# Patient Record
Sex: MALE | Race: WHITE | Employment: OTHER | URBAN - METROPOLITAN AREA
[De-identification: names, ages, dates, MRNs, and addresses within clinical notes are randomized per-mention and may not be internally consistent; named-entity substitution may affect disease eponyms.]

---

## 2021-08-15 ENCOUNTER — HOSPITAL ENCOUNTER (EMERGENCY)
Age: 76
Discharge: HOME | End: 2021-08-15
Payer: MEDICARE

## 2021-08-15 VITALS
DIASTOLIC BLOOD PRESSURE: 79 MMHG | RESPIRATION RATE: 16 BRPM | BODY MASS INDEX: 26.45 KG/M2 | SYSTOLIC BLOOD PRESSURE: 150 MMHG | OXYGEN SATURATION: 95 % | TEMPERATURE: 98.4 F | HEART RATE: 75 BPM | WEIGHT: 206 LBS

## 2021-08-15 DIAGNOSIS — L23.7 POISON IVY DERMATITIS: Primary | ICD-10-CM

## 2021-08-15 PROCEDURE — 99211 OFF/OP EST MAY X REQ PHY/QHP: CPT

## 2021-08-15 RX ORDER — PREDNISONE 20 MG/1
TABLET ORAL
Qty: 18 TABLET | Refills: 0 | Status: SHIPPED | OUTPATIENT
Start: 2021-08-15 | End: 2021-08-25

## 2021-08-15 NOTE — ED PROVIDER NOTES
adventitious breath sounds. Abdomen: Soft, nonsurgical. Nontender. No peritoneal signs. Normoactive bowel sounds. Extremities: No peripheral edema. Negative Homans bilaterally, no cords. Neurovascularly intact throughout. Skin: There is a very mild scattered rash bilaterally on the forehead as well as the right neck. No evidence of secondary infection. Negative Nikolsky sign. No mucous membrane lesions. Neuro: No gross neurologic deficits. Lab / Imaging Results   (All laboratory and radiology results have been personally reviewed by myself)  Labs:  No results found for this visit on 08/15/21. Imaging: All Radiology results interpreted by Radiologist unless otherwise noted. No orders to display       ED Course / Medical Decision Making   Medications - No data to display       Consult(s):   None    Procedure(s):   None    Differential Diagnosis: Is extensive but includes poison ivy dermatitis, dermatitis, tinea, cellulitis, lymphangitis, cutaneous abscess, urticaria, etc.    MDM:   This is a 70 y.o. male who presents with the above history and exam findings. He was concerned for possible zoster however we discussed this would be bilateral cranial nerves as well as the spinal nerve which would be incredibly uncommon with zoster. May represent a very early poison ivy rash. With home-going with a tapering dose of prednisone. No evidence of secondary infection. Counseling: I discussed the differential, results and discharge plan with the patient and/or family/friend/caregiver if present. I emphasized the importance of follow-up with the physician I referred them to in the timeframe recommended. I explained reasons for the patient to return to the Emergency Department. Additional verbal discharge instructions were also given and discussed with the patient to supplement those generated by the EMR.  We also discussed medications that were prescribed (if any) including common side effects and interactions. The patient was advised to abstain from driving, operating heavy machinery or making significant decisions while taking medications such as opiates and muscle relaxers that may impair this. All questions were addressed. They understand return precautions and discharge instructions. The patient and/or family/friend/caregiver expressed understanding. Assessment      1. Poison ivy dermatitis      Plan   Discharge to home and advised to contact Chiqui Romero, 2021 Kaley Rabago [phone]      As needed   Patient condition is good    New Medications     New Prescriptions    PREDNISONE (DELTASONE) 20 MG TABLET    Sig.: Take 60mg  Po qd x 3 days, then 40mg po qd x3 days, then 20mg po qd x 3 days. QS x 9 days     Electronically signed by EFRAÍN Solano   DD: 8/15/21  **This report was transcribed using voice recognition software. Every effort was made to ensure accuracy; however, inadvertent computerized transcription errors may be present.   END OF ED PROVIDER NOTE              Indigo Corea [address] [place], [place]  08/15/21 3314

## 2022-08-21 ENCOUNTER — HOSPITAL ENCOUNTER (EMERGENCY)
Age: 77
Discharge: HOME | End: 2022-08-21
Payer: MEDICARE

## 2022-08-21 VITALS
RESPIRATION RATE: 14 BRPM | OXYGEN SATURATION: 98 % | WEIGHT: 205 LBS | BODY MASS INDEX: 26.32 KG/M2 | SYSTOLIC BLOOD PRESSURE: 129 MMHG | TEMPERATURE: 98 F | DIASTOLIC BLOOD PRESSURE: 85 MMHG | HEART RATE: 64 BPM

## 2022-08-21 DIAGNOSIS — Z51.89 VISIT FOR WOUND CHECK: Primary | ICD-10-CM

## 2022-08-21 PROCEDURE — 90715 TDAP VACCINE 7 YRS/> IM: CPT | Performed by: PHYSICIAN ASSISTANT

## 2022-08-21 PROCEDURE — 99211 OFF/OP EST MAY X REQ PHY/QHP: CPT

## 2022-08-21 PROCEDURE — 6360000002 HC RX W HCPCS: Performed by: PHYSICIAN ASSISTANT

## 2022-08-21 PROCEDURE — 90471 IMMUNIZATION ADMIN: CPT | Performed by: PHYSICIAN ASSISTANT

## 2022-08-21 RX ORDER — VENLAFAXINE HYDROCHLORIDE 150 MG/1
150 TABLET, EXTENDED RELEASE ORAL
COMMUNITY

## 2022-08-21 RX ORDER — ZINC GLUCONATE 50 MG
50 TABLET ORAL DAILY
COMMUNITY

## 2022-08-21 RX ORDER — OMEGA-3S/DHA/EPA/FISH OIL/D3 300MG-1000
400 CAPSULE ORAL DAILY
COMMUNITY

## 2022-08-21 RX ORDER — RIVASTIGMINE 4.6 MG/24H
1 PATCH, EXTENDED RELEASE TRANSDERMAL DAILY
COMMUNITY

## 2022-08-21 RX ORDER — ASCORBIC ACID 500 MG
TABLET ORAL DAILY
COMMUNITY

## 2022-08-21 RX ORDER — VITAMIN E 268 MG
400 CAPSULE ORAL DAILY
COMMUNITY

## 2022-08-21 RX ORDER — CEPHALEXIN 500 MG/1
500 CAPSULE ORAL 3 TIMES DAILY
Qty: 30 CAPSULE | Refills: 0 | Status: SHIPPED | OUTPATIENT
Start: 2022-08-21 | End: 2022-08-31

## 2022-08-21 RX ADMIN — TETANUS TOXOID, REDUCED DIPHTHERIA TOXOID AND ACELLULAR PERTUSSIS VACCINE, ADSORBED 0.5 ML: 5; 2.5; 8; 8; 2.5 SUSPENSION INTRAMUSCULAR at 16:10

## 2022-08-21 ASSESSMENT — PAIN SCALES - GENERAL: PAINLEVEL_OUTOF10: 7

## 2022-08-21 ASSESSMENT — PAIN DESCRIPTION - LOCATION: LOCATION: HAND

## 2022-08-21 ASSESSMENT — PAIN - FUNCTIONAL ASSESSMENT: PAIN_FUNCTIONAL_ASSESSMENT: 0-10

## 2022-08-21 ASSESSMENT — PAIN DESCRIPTION - ORIENTATION: ORIENTATION: LEFT

## 2022-08-21 ASSESSMENT — PAIN DESCRIPTION - DESCRIPTORS: DESCRIPTORS: TENDER;SORE

## 2022-08-21 NOTE — ED PROVIDER NOTES
[address] [facility]  Department of Emergency Medicine  UC Encounter Note  22   2:08 PM EDT      NAME: Elias Lopez  :  1945  MRN:  [id number]    Chief Complaint: Hand Pain (Pt woke up this am with pain to left 5th PIP joint  this am. Pt has redness and pain to touch. Pt cut that finger 1 week ago but that area appears healed)      This is a 68-year-old male the presents to urgent care complaining of some redness and swelling to his left fifth finger. He states about a week ago he cut it. He is unsure of his last tetanus shot. He denies any numbness or tingling. No bony pain. No other hand pain. On first contact patient he appears to be in no acute distress. Review of Systems  Pertinent positives and negatives are stated within HPI, all other systems reviewed and are negative. Physical Exam  Vitals and nursing note reviewed. Constitutional:       Appearance: He is well-developed. HENT:      Head: Normocephalic and atraumatic. Jaw: No trismus. Right Ear: Hearing, tympanic membrane, ear canal and external ear normal.      Left Ear: Hearing, tympanic membrane, ear canal and external ear normal.      Nose: Nose normal.      Right Sinus: No maxillary sinus tenderness or frontal sinus tenderness. Left Sinus: No maxillary sinus tenderness or frontal sinus tenderness. Mouth/Throat:      Pharynx: Uvula midline. No uvula swelling. Eyes:      General: Lids are normal.      Conjunctiva/sclera: Conjunctivae normal.      Pupils: Pupils are equal, round, and reactive to light. Cardiovascular:      Rate and Rhythm: Normal rate and regular rhythm. Heart sounds: Normal heart sounds. No murmur heard. Pulmonary:      Effort: Pulmonary effort is normal.      Breath sounds: Normal breath sounds. Abdominal:      General: Bowel sounds are normal.      Palpations: Abdomen is soft. Abdomen is not rigid. Tenderness: There is no abdominal tenderness. There is no guarding or rebound. Musculoskeletal:      Cervical back: Normal range of motion and neck supple. Skin:     General: Skin is warm and dry. Findings: Wound present. No abrasion or rash. Comments: Healing laceration site to the middle phalanx of the left fifth finger. I do not appreciate any drainage or bleeding at this time. He does have some redness to the proximal phalanx of his left index finger but he has full range of motion but he does have some mild tenderness to soft tissues. Capillary refill is brisk. The rest of the hand appears to be atraumatic. Has palpable radial pulse. Neurological:      General: No focal deficit present. Mental Status: He is alert and oriented to person, place, and time. GCS: GCS eye subscore is 4. GCS verbal subscore is 5. GCS motor subscore is 6. Cranial Nerves: No cranial nerve deficit. Sensory: No sensory deficit. Coordination: Coordination normal.      Gait: Gait normal.       Procedures    MDM  Number of Diagnoses or Management Options  Visit for wound check  Diagnosis management comments: Patient in no acute distress. Will place on an antibiotic to prevent any worsening infection. Told return if symptoms worsen. We will update his tetanus. --------------------------------------------- PAST HISTORY ---------------------------------------------  Past Medical History:  has a past medical history of Blood clot in vein, H/O Bell's palsy, Hx of sciatica, and Lewy body dementia ([facility] [address] Past Surgical History:  has a past surgical history that includes Finger trigger release and Colonoscopy. Social History:  reports that he has quit smoking. He does not have any smokeless tobacco history on file. He reports current alcohol use. He reports that he does not use drugs. Family History: family history is not on file. The patients home medications have been reviewed.     Allergies: Patient has no known allergies. -------------------------------------------------- RESULTS -------------------------------------------------  No results found for this visit on 08/21/22. No orders to display       ------------------------- NURSING NOTES AND VITALS REVIEWED ---------------------------   The nursing notes within the ED encounter and vital signs as below have been reviewed. /85   Pulse 64   Temp 98 °F (36.7 °C)   Resp 14   Wt 205 lb (93 kg)   SpO2 98%   BMI 26.32 kg/m²   Oxygen Saturation Interpretation: Normal      ------------------------------------------ PROGRESS NOTES ------------------------------------------   I have spoken with the patient and discussed todays results, in addition to providing specific details for the plan of care and counseling regarding the diagnosis and prognosis. Their questions are answered at this time and they are agreeable with the plan.      --------------------------------- ADDITIONAL PROVIDER NOTES ---------------------------------     This patient is stable for discharge. I have shared the specific conditions for return, as well as the importance of follow-up. * NOTE: This report was transcribed using voice recognition software. Every effort was made to ensure accuracy; however, inadvertent computerized transcription errors may be present.    --------------------------------- IMPRESSION AND DISPOSITION ---------------------------------    IMPRESSION  1.  Visit for wound check        DISPOSITION  Disposition: Discharge to home  Patient condition is good       Haleigh Trent PA-C  08/21/22 0886

## 2023-11-13 PROBLEM — R35.1 NOCTURIA: Status: ACTIVE | Noted: 2023-11-13

## 2023-11-13 PROBLEM — N40.1 BPH WITH OBSTRUCTION/LOWER URINARY TRACT SYMPTOMS: Status: ACTIVE | Noted: 2023-11-13

## 2023-11-13 PROBLEM — G30.1: Status: ACTIVE | Noted: 2023-11-13

## 2023-11-13 PROBLEM — G31.83: Status: ACTIVE | Noted: 2023-11-13

## 2023-11-13 PROBLEM — F02.80: Status: ACTIVE | Noted: 2023-11-13

## 2023-11-13 PROBLEM — F02.A2: Status: ACTIVE | Noted: 2023-11-13

## 2023-11-13 PROBLEM — F03.90 MAJOR NEUROCOGNITIVE DISORDER (MULTI): Status: ACTIVE | Noted: 2023-11-13

## 2023-11-13 PROBLEM — N13.8 BPH WITH OBSTRUCTION/LOWER URINARY TRACT SYMPTOMS: Status: ACTIVE | Noted: 2023-11-13

## 2023-11-13 PROBLEM — R41.3 MEMORY IMPAIRMENT: Status: ACTIVE | Noted: 2023-11-13

## 2023-11-13 RX ORDER — QUETIAPINE FUMARATE 100 MG/1
1 TABLET, FILM COATED ORAL 2 TIMES DAILY
COMMUNITY
Start: 2022-02-07 | End: 2024-04-02 | Stop reason: SDUPTHER

## 2023-11-13 RX ORDER — SILDENAFIL 100 MG/1
TABLET, FILM COATED ORAL
COMMUNITY
Start: 2022-02-07

## 2023-11-13 RX ORDER — ASPIRIN 81 MG/1
TABLET ORAL
COMMUNITY
Start: 2022-02-07 | End: 2024-04-02 | Stop reason: SDUPTHER

## 2023-11-13 RX ORDER — VENLAFAXINE HYDROCHLORIDE 75 MG/1
CAPSULE, EXTENDED RELEASE ORAL
COMMUNITY
Start: 2022-02-07 | End: 2024-04-02 | Stop reason: HOSPADM

## 2023-11-13 RX ORDER — CARBOXYMETHYLCELLULOSE SODIUM 5 MG/ML
SOLUTION/ DROPS OPHTHALMIC
COMMUNITY
Start: 2022-02-07

## 2023-11-13 RX ORDER — VIT C/E/ZN/COPPR/LUTEIN/ZEAXAN 250MG-90MG
1 CAPSULE ORAL DAILY
COMMUNITY
Start: 2022-02-07

## 2023-11-13 RX ORDER — MAGNESIUM OXIDE/MAG AA CHELATE 300 MG
CAPSULE ORAL
COMMUNITY
End: 2024-04-02 | Stop reason: HOSPADM

## 2023-11-13 RX ORDER — TESTOSTERONE 20.25 MG/1.25G
GEL TOPICAL
COMMUNITY
Start: 2022-02-07

## 2023-11-13 RX ORDER — RIVASTIGMINE 4.6 MG/24H
PATCH, EXTENDED RELEASE TRANSDERMAL
COMMUNITY
Start: 2022-07-19 | End: 2024-01-04 | Stop reason: RX

## 2023-11-14 ENCOUNTER — OFFICE VISIT (OUTPATIENT)
Dept: NEUROLOGY | Facility: CLINIC | Age: 78
End: 2023-11-14
Payer: MEDICARE

## 2023-11-14 VITALS
HEART RATE: 67 BPM | DIASTOLIC BLOOD PRESSURE: 76 MMHG | BODY MASS INDEX: 26.46 KG/M2 | SYSTOLIC BLOOD PRESSURE: 110 MMHG | RESPIRATION RATE: 16 BRPM | WEIGHT: 206.1 LBS

## 2023-11-14 DIAGNOSIS — F02.80 LATE ONSET ALZHEIMER'S DISEASE WITHOUT BEHAVIORAL DISTURBANCE (MULTI): Primary | ICD-10-CM

## 2023-11-14 DIAGNOSIS — G30.1 LATE ONSET ALZHEIMER'S DISEASE WITHOUT BEHAVIORAL DISTURBANCE (MULTI): Primary | ICD-10-CM

## 2023-11-14 PROCEDURE — 1126F AMNT PAIN NOTED NONE PRSNT: CPT | Performed by: PSYCHIATRY & NEUROLOGY

## 2023-11-14 PROCEDURE — 99214 OFFICE O/P EST MOD 30 MIN: CPT | Mod: PO | Performed by: PSYCHIATRY & NEUROLOGY

## 2023-11-14 PROCEDURE — 1036F TOBACCO NON-USER: CPT | Performed by: PSYCHIATRY & NEUROLOGY

## 2023-11-14 PROCEDURE — 99214 OFFICE O/P EST MOD 30 MIN: CPT | Performed by: PSYCHIATRY & NEUROLOGY

## 2023-11-14 PROCEDURE — 1159F MED LIST DOCD IN RCRD: CPT | Performed by: PSYCHIATRY & NEUROLOGY

## 2023-11-14 RX ORDER — DONEPEZIL HYDROCHLORIDE 10 MG/1
10 TABLET, FILM COATED ORAL DAILY
Qty: 30 TABLET | Refills: 0 | Status: SHIPPED | OUTPATIENT
Start: 2023-11-14 | End: 2024-04-02 | Stop reason: HOSPADM

## 2023-11-14 ASSESSMENT — PATIENT HEALTH QUESTIONNAIRE - PHQ9
SUM OF ALL RESPONSES TO PHQ9 QUESTIONS 1 AND 2: 0
1. LITTLE INTEREST OR PLEASURE IN DOING THINGS: NOT AT ALL
2. FEELING DOWN, DEPRESSED OR HOPELESS: NOT AT ALL

## 2023-11-14 ASSESSMENT — ENCOUNTER SYMPTOMS
DEPRESSION: 0
OCCASIONAL FEELINGS OF UNSTEADINESS: 0
LOSS OF SENSATION IN FEET: 0

## 2023-11-14 ASSESSMENT — PAIN SCALES - GENERAL: PAINLEVEL: 0-NO PAIN

## 2023-11-14 NOTE — PROGRESS NOTES
[place], [place]      Department of Neurology  [facility]    FU1 Consultation    Dr. Poppy Catherine         2023  Re: Robert Alegria  : 1945  MRN [id number]    CC: 79yo man in care for memory loss.  Info from pt, spouse, cami, and EMR.  HPI:  Remote: Pt's wife describes sudden onset of Rt facial immobility while pt eating a muffin. 2019: Family first noticed memory trouble.  : Sustained insomnia, not assoc with Covid.  He dvd nightmares with thrashing, kicking, and daytime anxiety leading to psychiatric hospitalization that led to dx PTSD (post [place]).  : Dx dementia with progressive decline. :  Feels well, memory “on-off,” pauses “where am I going.”  Wife sts train of thought, less conversation with evident progression per family.    Med Hx  Allergies: NKAD  Rx:   [id number], [id number],  [id number],  exelonTM4.6,   melatonin   ofaiqghuxx953, testostgel1.62, D3, Mg, Calc, Zn, tearsOUprn   H/O: Rt Gutierrez's Palsy, psychiatric dis (depress, PTSD), Lt leg DVT ()  S/P: trigger finger repair  Implants: none    Trauma: head injury in skating fall 40ya  FHx: Dad; Mom COPD, no sibs;  2dau (Heather Cutler) 1 son (Chan)  PSHx:  53y, in [facility], HS grad, [employer] vet, still driving; sleep, exercise, habits good   ROS: w/o Lt face tx effect nl Cardio-pulm nl     U/L-GI nl     Neuro: w/o LBP nl, w/o neck pain wnl, w/oHAs wnl   Neurovasc: w/o CVA, TIA, TMB, VBI   Exam: CL=283/76, HR=67, RR=16, pe=702  Genl: Neck FROM w/o pain  w/ Rt neck soft-tissue wasting w/ promin muscle bands    Lungs w/ clear,    RRR w/o MRG,  full carotids w/o T/B        Abdo soft +BS no bruit Extrems w/o depend edema  Neuro MS: not anxious or sad  no delus/hallucs/agitation    CN:  FEOM w/ horiz diplopia on far rt or Lt gaze  OD ptosis    Face LT intact; Motor: depressed Rt U&L facial tone (lost Rt forehead wrinkles     Rt neck wasting with prominent muscle bands       Motor: strong & steady   w/ wnl tone   w/o invol mvmts aymp swallowing   MSRs: +2  w/o spread or release   Sens:  symm LT throughout wnl    -Romberg,  symm sway <1cm      Coordin: FT fast w/ reg pace bilat  FNF accurate & symm to fixed target      Gait: nl pace and stride w/ symm armswing,  turns in 1.0  steps,  stable tandem   Cognitive: RH  Language:  recept: answers & follows,  express: 3-5word phrases w/o WFD or   paraphasias,  repeat: complete w/ nl pace; naming: body parts=3/3, objects=3/3    Praxis:  intrans: hand shapes to model=3/3  transitive: pen twirling  Lt wnl Rt wnl    Percept:  visual: traffic signs=3/4,  imbedded objs (w/o clutter)=1/5     Atten: visual:  Lt/Rt w/o DSSE wnl,  tactile: Lt/Rt hand contact w/o DSSE wnl    Mem: room topology=3/3;   remote: USholidays=3/3   Neuropsych (Irene, 11-Apr-2022): …amnestic mem impair, w/ verbal fluency deficits, impair  exec func, w/ preserved visspat ability...”This neuropsych profile, w/ neurological eval  eliciting h/o REM sleep dis and complex visual hallucins/illusions, c/w LBD.     Labs (to 24-Jun-2022):  wbc=5.7,    hct=50.9,    hrm=808   ESR=4,  CRP=0.7   pt=11.1, INR=1.0  glu=95, bun/crt=27/0.9,  eGFR=82,     AST=25  ALT=23  AlkP=82   utzl=385, hdl=46, wzs=303, chol/hdl=, vldl=, YF=608    TSH=1.37     R85=125  CSF LP (24-Jun-2022): C&C, wbc=1, rbc=6, (5l%lym, 44%mono=35), glu=59, pro=35 IgG=wnl  EKG (9-Sep-2022):  NSR=68 QTcF =398    Brain MRI (21-Feb-2022):  Vents, sulci & cisterns appro to vol loss.  No diffusion restrict of acute  infarct. Subtle vol loss w/ blooming on GE of Rt caudate c/w lacune w/ hemosiderin.   Also GE bloom in Lt centrum and Lt peduncle also w/ chronic hemosiderin deposition. No acute hemorr. Genl vol loss, mild for age. Nonspec T2 in WM c/w chronic sml vsl dis. No mass effect or shift.  A&P: Summ: Several y/o incr memory trouble, w/ insomnia then psych hospit w/ dx PTSD ([place] vet)àprogressive cognitive decline.   Genl exam w/ Rt neck soft tissue wasting w/ prominent muscles and vessels. Neuro exam w/ Rt ptosis, face [Lt>Rt] & neck [Rt wasting].  Cognitive exam w/ poor Lt hand motor seq learning & poor vis discrim.  Labs: thrombocytopenia (atf=572) and hyperchol (wsr=290), w/ benign CSF.  Neuropsych supports dx of LBD.  Brain MRI shows Rt caudate c/w lacune w/ hemosiderin along with Lt centrum and Lt peduncle hemosiderin.  Interpret: Pt has a h/o sudden onset of Rt Gutierrez's then followed by a sleep disorder leading to a psychiatric hospitalization and then followed by progressive cognitive decline c/w Lewy Body dis.  The MRI with Rt caudate and Lt centrum an peduncle lacunes suggest the alternative diagnoses that raise the possibility of micro-hemorrhagic cerebral amyloid angiopathy (CAA).  Plan: Medicinal regimen appropriate.  He is active and engaged.  I will not suggest medicinal or care changes at this time.  F/U:  They want to come off the patch and I will Rx [id number], then wait.  I will RTC in 2 months and then consider adding Namenda by titration.  I discussed these matters with the pt and companions.  They asked questions and showed good understanding.    Thank you for allowing me to participate in your care.   Sincerely yours, Saúl Sandoval M.D., Ph.D.

## 2023-12-25 PROBLEM — R04.0 EPISTAXIS: Status: ACTIVE | Noted: 2023-12-25

## 2023-12-25 PROBLEM — D23.70 BENIGN NEOPLASM OF SKIN OF LOWER LIMB: Status: ACTIVE | Noted: 2023-12-25

## 2023-12-25 PROBLEM — F41.1 GENERALIZED ANXIETY DISORDER: Status: ACTIVE | Noted: 2023-12-25

## 2023-12-25 PROBLEM — H10.10 ACUTE ATOPIC CONJUNCTIVITIS: Status: ACTIVE | Noted: 2023-12-25

## 2023-12-25 PROBLEM — E78.2 MIXED HYPERLIPIDEMIA: Status: ACTIVE | Noted: 2023-12-25

## 2023-12-25 PROBLEM — H57.89 IRRITATION OF RIGHT EYE: Status: ACTIVE | Noted: 2023-12-25

## 2023-12-25 PROBLEM — L60.3 ONYCHODYSTROPHY: Status: ACTIVE | Noted: 2023-12-25

## 2023-12-25 PROBLEM — F09 MILD COGNITIVE DISORDER: Status: ACTIVE | Noted: 2023-12-25

## 2023-12-25 PROBLEM — F02.80: Status: ACTIVE | Noted: 2023-12-25

## 2023-12-25 PROBLEM — F10.20 ALCOHOL DEPENDENCY (MULTI): Status: ACTIVE | Noted: 2023-12-25

## 2023-12-25 PROBLEM — S92.402A FRACTURE OF LEFT GREAT TOE: Status: ACTIVE | Noted: 2023-12-25

## 2023-12-25 PROBLEM — G31.83: Status: ACTIVE | Noted: 2023-12-25

## 2023-12-25 PROBLEM — H04.129 TEAR FILM INSUFFICIENCY: Status: ACTIVE | Noted: 2023-12-25

## 2023-12-25 PROBLEM — H90.5 SENSORINEURAL HEARING LOSS (SNHL): Status: ACTIVE | Noted: 2023-12-25

## 2023-12-25 PROBLEM — F43.12 CHRONIC POST-TRAUMATIC STRESS DISORDER (PTSD): Status: ACTIVE | Noted: 2023-12-25
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
## 2023-12-25 PROBLEM — S76.019A HIP STRAIN: Status: ACTIVE | Noted: 2023-12-25

## 2023-12-25 PROBLEM — T88.9XXA COMPLICATIONS OF MEDICAL CARE: Status: ACTIVE | Noted: 2023-12-25

## 2023-12-25 PROBLEM — F41.9 ANXIETY: Status: ACTIVE | Noted: 2023-12-25

## 2023-12-25 PROBLEM — L98.9 DISORDER OF SKIN: Status: ACTIVE | Noted: 2023-12-25

## 2023-12-25 PROBLEM — F43.12 POST-TRAUMATIC STRESS DISORDER, CHRONIC: Status: ACTIVE | Noted: 2023-12-25

## 2023-12-25 PROBLEM — Z77.098 EXPOSURE TO CHEMICAL IRRITANT: Status: ACTIVE | Noted: 2023-12-25

## 2023-12-25 PROBLEM — D21.0: Status: ACTIVE | Noted: 2023-12-25

## 2023-12-25 PROBLEM — M65.30 TRIGGER FINGER: Status: ACTIVE | Noted: 2023-12-25

## 2023-12-25 PROBLEM — I82.409 DEEP VEIN THROMBOSIS (MULTI): Status: ACTIVE | Noted: 2023-12-25

## 2023-12-25 PROBLEM — L60.3 NAIL DYSTROPHY: Status: ACTIVE | Noted: 2023-12-25

## 2023-12-25 PROBLEM — F43.23 ADJUSTMENT DISORDER WITH MIXED ANXIETY AND DEPRESSED MOOD: Status: ACTIVE | Noted: 2023-12-25

## 2023-12-25 PROBLEM — H53.2 DIPLOPIA: Status: ACTIVE | Noted: 2023-12-25

## 2023-12-25 PROBLEM — N40.0 BENIGN PROSTATIC HYPERPLASIA: Status: ACTIVE | Noted: 2023-12-25

## 2023-12-25 PROBLEM — E29.1 HYPOGONADISM IN MALE: Status: ACTIVE | Noted: 2023-12-25

## 2023-12-25 PROBLEM — H90.3 SENSORINEURAL HEARING LOSS, BILATERAL: Status: ACTIVE | Noted: 2023-12-25

## 2023-12-25 PROBLEM — G47.00 INSOMNIA: Status: ACTIVE | Noted: 2023-12-25

## 2023-12-25 RX ORDER — ZINC GLUCONATE 50 MG
50 TABLET ORAL DAILY
COMMUNITY

## 2023-12-25 RX ORDER — SERTRALINE HYDROCHLORIDE 50 MG/1
50 TABLET, FILM COATED ORAL DAILY
COMMUNITY
End: 2024-04-02 | Stop reason: HOSPADM

## 2023-12-25 RX ORDER — MUPIROCIN 20 MG/G
OINTMENT TOPICAL
COMMUNITY
End: 2024-04-02 | Stop reason: HOSPADM

## 2023-12-25 RX ORDER — PREDNISONE 10 MG/1
TABLET ORAL
COMMUNITY
End: 2024-04-02 | Stop reason: HOSPADM

## 2023-12-25 RX ORDER — VALACYCLOVIR HYDROCHLORIDE 1 G/1
TABLET, FILM COATED ORAL
COMMUNITY
End: 2024-04-02 | Stop reason: ALTCHOICE

## 2023-12-25 RX ORDER — CLINDAMYCIN PHOSPHATE 10 UG/ML
LOTION TOPICAL
COMMUNITY
End: 2024-04-02 | Stop reason: HOSPADM

## 2023-12-25 RX ORDER — ASPIRIN 81 MG/1
81 TABLET ORAL DAILY
COMMUNITY

## 2023-12-25 RX ORDER — ASCORBIC ACID 500 MG
TABLET ORAL
COMMUNITY

## 2023-12-25 RX ORDER — AZITHROMYCIN 250 MG/1
TABLET, FILM COATED ORAL
COMMUNITY
End: 2024-04-02

## 2023-12-25 RX ORDER — DOXYCYCLINE HYCLATE 100 MG
TABLET ORAL
COMMUNITY
End: 2024-04-02 | Stop reason: HOSPADM

## 2023-12-25 RX ORDER — HYDROXYZINE HYDROCHLORIDE 10 MG/1
TABLET, FILM COATED ORAL
COMMUNITY
End: 2024-04-02 | Stop reason: HOSPADM

## 2023-12-25 RX ORDER — VITAMIN E 268 MG
400 CAPSULE ORAL DAILY
COMMUNITY

## 2024-01-02 ENCOUNTER — APPOINTMENT (OUTPATIENT)
Dept: BEHAVIORAL HEALTH | Facility: CLINIC | Age: 79
End: 2024-01-02
Payer: MEDICARE

## 2024-01-03 ENCOUNTER — TELEPHONE (OUTPATIENT)
Dept: NEUROLOGY | Facility: CLINIC | Age: 79
End: 2024-01-03
Payer: MEDICARE

## 2024-01-04 DIAGNOSIS — F03.90 DEMENTIA WITHOUT BEHAVIORAL DISTURBANCE (MULTI): Primary | ICD-10-CM

## 2024-01-04 RX ORDER — RIVASTIGMINE TARTRATE 1.5 MG/1
1.5 CAPSULE ORAL 2 TIMES DAILY
Qty: 60 CAPSULE | Refills: 11 | Status: SHIPPED | OUTPATIENT
Start: 2024-01-04 | End: 2024-04-02 | Stop reason: SDUPTHER

## 2024-01-11 ENCOUNTER — TELEPHONE (OUTPATIENT)
Dept: NEUROLOGY | Facility: CLINIC | Age: 79
End: 2024-01-11
Payer: MEDICARE

## 2024-01-16 DIAGNOSIS — Z00.6 RESEARCH STUDY PATIENT: Primary | ICD-10-CM

## 2024-01-17 ENCOUNTER — OFFICE VISIT (OUTPATIENT)
Dept: NEUROLOGY | Facility: CLINIC | Age: 79
End: 2024-01-17
Payer: MEDICARE

## 2024-01-17 DIAGNOSIS — Z00.6 RESEARCH STUDY PATIENT: ICD-10-CM

## 2024-01-17 LAB
BASOPHILS # BLD AUTO: 0.09 X10*3/UL (ref 0–0.1)
BASOPHILS NFR BLD AUTO: 1.6 %
CRP SERPL HS-MCNC: 0.9 MG/L
EOSINOPHIL # BLD AUTO: 0.26 X10*3/UL (ref 0–0.4)
EOSINOPHIL NFR BLD AUTO: 4.7 %
ERYTHROCYTE [DISTWIDTH] IN BLOOD BY AUTOMATED COUNT: 12.9 % (ref 11.5–14.5)
ERYTHROCYTE [SEDIMENTATION RATE] IN BLOOD BY WESTERGREN METHOD: 3 MM/H (ref 0–20)
HCT VFR BLD AUTO: 51.1 % (ref 41–52)
HGB BLD-MCNC: 17.6 G/DL (ref 13.5–17.5)
IMM GRANULOCYTES # BLD AUTO: 0.04 X10*3/UL (ref 0–0.5)
IMM GRANULOCYTES NFR BLD AUTO: 0.7 % (ref 0–0.9)
LYMPHOCYTES # BLD AUTO: 1.02 X10*3/UL (ref 0.8–3)
LYMPHOCYTES NFR BLD AUTO: 18.2 %
MCH RBC QN AUTO: 31.8 PG (ref 26–34)
MCHC RBC AUTO-ENTMCNC: 34.4 G/DL (ref 32–36)
MCV RBC AUTO: 92 FL (ref 80–100)
MONOCYTES # BLD AUTO: 0.44 X10*3/UL (ref 0.05–0.8)
MONOCYTES NFR BLD AUTO: 7.9 %
NEUTROPHILS # BLD AUTO: 3.74 X10*3/UL (ref 1.6–5.5)
NEUTROPHILS NFR BLD AUTO: 66.9 %
NRBC BLD-RTO: 0 /100 WBCS (ref 0–0)
PLATELET # BLD AUTO: 157 X10*3/UL (ref 150–450)
RBC # BLD AUTO: 5.53 X10*6/UL (ref 4.5–5.9)
WBC # BLD AUTO: 5.6 X10*3/UL (ref 4.4–11.3)

## 2024-01-17 PROCEDURE — 86141 C-REACTIVE PROTEIN HS: CPT | Performed by: PSYCHIATRY & NEUROLOGY

## 2024-01-17 PROCEDURE — 85025 COMPLETE CBC W/AUTO DIFF WBC: CPT | Performed by: PSYCHIATRY & NEUROLOGY

## 2024-01-17 PROCEDURE — 1036F TOBACCO NON-USER: CPT | Performed by: PSYCHIATRY & NEUROLOGY

## 2024-01-17 PROCEDURE — 36415 COLL VENOUS BLD VENIPUNCTURE: CPT | Performed by: PSYCHIATRY & NEUROLOGY

## 2024-01-17 PROCEDURE — 99215 OFFICE O/P EST HI 40 MIN: CPT | Performed by: PSYCHIATRY & NEUROLOGY

## 2024-01-17 PROCEDURE — 1126F AMNT PAIN NOTED NONE PRSNT: CPT | Performed by: PSYCHIATRY & NEUROLOGY

## 2024-01-17 PROCEDURE — 85652 RBC SED RATE AUTOMATED: CPT | Performed by: PSYCHIATRY & NEUROLOGY

## 2024-01-23 ENCOUNTER — TELEPHONE (OUTPATIENT)
Dept: NEUROLOGY | Facility: CLINIC | Age: 79
End: 2024-01-23

## 2024-04-02 ENCOUNTER — OFFICE VISIT (OUTPATIENT)
Dept: NEUROLOGY | Facility: CLINIC | Age: 79
End: 2024-04-02
Payer: MEDICARE

## 2024-04-02 VITALS
BODY MASS INDEX: 27.07 KG/M2 | SYSTOLIC BLOOD PRESSURE: 147 MMHG | HEART RATE: 70 BPM | DIASTOLIC BLOOD PRESSURE: 79 MMHG | RESPIRATION RATE: 18 BRPM | WEIGHT: 210.8 LBS | TEMPERATURE: 99.4 F

## 2024-04-02 DIAGNOSIS — F02.80 LATE ONSET ALZHEIMER'S DISEASE WITHOUT BEHAVIORAL DISTURBANCE (MULTI): Primary | ICD-10-CM

## 2024-04-02 DIAGNOSIS — F03.90 DEMENTIA WITHOUT BEHAVIORAL DISTURBANCE (MULTI): ICD-10-CM

## 2024-04-02 DIAGNOSIS — G30.1 LATE ONSET ALZHEIMER'S DISEASE WITHOUT BEHAVIORAL DISTURBANCE (MULTI): Primary | ICD-10-CM

## 2024-04-02 PROCEDURE — 1126F AMNT PAIN NOTED NONE PRSNT: CPT | Performed by: PSYCHIATRY & NEUROLOGY

## 2024-04-02 PROCEDURE — 99215 OFFICE O/P EST HI 40 MIN: CPT | Performed by: PSYCHIATRY & NEUROLOGY

## 2024-04-02 PROCEDURE — 1157F ADVNC CARE PLAN IN RCRD: CPT | Performed by: PSYCHIATRY & NEUROLOGY

## 2024-04-02 PROCEDURE — 1036F TOBACCO NON-USER: CPT | Performed by: PSYCHIATRY & NEUROLOGY

## 2024-04-02 RX ORDER — RIVASTIGMINE TARTRATE 1.5 MG/1
1.5 CAPSULE ORAL 2 TIMES DAILY
Qty: 60 CAPSULE | Refills: 11 | Status: SHIPPED | OUTPATIENT
Start: 2024-04-02 | End: 2024-04-02 | Stop reason: WASHOUT

## 2024-04-02 RX ORDER — QUETIAPINE FUMARATE 100 MG/1
100 TABLET, FILM COATED ORAL 2 TIMES DAILY
Qty: 60 TABLET | Refills: 2 | Status: SHIPPED | OUTPATIENT
Start: 2024-04-02

## 2024-04-02 RX ORDER — DONEPEZIL HYDROCHLORIDE 10 MG/1
10 TABLET, FILM COATED ORAL NIGHTLY
Qty: 30 TABLET | Refills: 2 | Status: SHIPPED | OUTPATIENT
Start: 2024-04-02 | End: 2025-04-02

## 2024-04-02 ASSESSMENT — PAIN SCALES - GENERAL: PAINLEVEL: 0-NO PAIN

## 2024-04-02 ASSESSMENT — ENCOUNTER SYMPTOMS
LOSS OF SENSATION IN FEET: 0
DEPRESSION: 0
OCCASIONAL FEELINGS OF UNSTEADINESS: 0

## 2024-04-02 ASSESSMENT — PATIENT HEALTH QUESTIONNAIRE - PHQ9
2. FEELING DOWN, DEPRESSED OR HOPELESS: SEVERAL DAYS
1. LITTLE INTEREST OR PLEASURE IN DOING THINGS: NOT AT ALL
SUM OF ALL RESPONSES TO PHQ9 QUESTIONS 1 AND 2: 1
10. IF YOU CHECKED OFF ANY PROBLEMS, HOW DIFFICULT HAVE THESE PROBLEMS MADE IT FOR YOU TO DO YOUR WORK, TAKE CARE OF THINGS AT HOME, OR GET ALONG WITH OTHER PEOPLE: SOMEWHAT DIFFICULT

## 2024-04-02 NOTE — PROGRESS NOTES
[place], [place]      Department of Neurology  [facility]    FU Consultation    PCP: Dr. Poppy Catherine         Re: Robert Alegria  : 1945  MRN [id number]    CC: 77yo man in care for memory loss.  Info from pt, spouse, cami (Omayra), and EMR.  HPI:  Remote: Pt's wife describes sudden onset of Rt facial immobility while pt eating a muffin. 2019: Family noticed memory trouble.  : Sustained insomnia, not w/ Covid.  Nightmares with thrashing, kicking, and daytime anxiety leading to psychiatric hospitalization led to dx PTSD (post [place]).  : Dx dementia w/ progressive decline. : Feels well, memory “on-off,” pauses “where am I going.”  Wife sts train of thought, less conversation with evident progression per family.  :  Benches & Free-weights, walking w/o problem.    Med Hx  Allergies: NKAD  Rx:   (stopped b/o bloody nose), [id number],  exelon1.5bid,    [id number](renewed as ½ pill x 3wks, then full pill HS),   [id number], melatonin  holyqqeqqo609, testostgel1.62, D3, Mg, Calc, Zn, tearsOUprn   H/O: LBD, HLD, DVT, SNHL, BPH, hypogonad, Rt Gutierrez's Palsy, diplopia,     psychiatric dis (depress, anx, PTSD),    Lt leg DVT ()  S/P: trigger finger repair  Implants: none    Trauma: head injury in skating fall 42ya  FHx: Dad; Mom COPD, no sibs;  2dau (Heather Cutler) 1 son (Chan)  PSHx:  53y, in [employer], HS grad, [employer] vet, still driving; sleep, exercise, habits good   ROS: w/o Lt face tx effect nl Cardio-pulm nl     U/L-GI nl     Neuro: w/o LBP nl, w/o neck pain wnl, w/oHAs wnl   Neurovasc: w/o CVA, TIA, TMB, VBI   Exam: KA=191/76, HR=67, RR=16, uv=261  Genl: Neck FROM w/o pain  w/ Rt neck soft-tissue wasting w/ promin muscle bands    Lungs w/ clear,    RRR w/o MRG,  full carotids w/o T/B        Abdo soft +BS no bruit Extrems w/o depend edema  Neuro MS: not anxious or sad  no delus/hallucs/agitation    CN:  FEOM w/ horiz diplopia on far  rt or Lt gaze  OD ptosis    Face LT intact; Motor: depressed Rt U&L facial tone (lost Rt forehead wrinkles     Rt neck wasting with prominent muscle bands      Motor: strong & steady   w/ wnl tone   w/o invol mvmts aymp swallowing   MSRs: +2  w/o spread or release   Sens:  symm LT throughout wnl    -Romberg,  symm sway <1cm      Coordin: FT fast w/ reg pace bilat  FNF accurate & symm to fixed target      Gait: nl pace and stride w/ symm armswing,  turns in 1.0  steps,  stable tandem   Cognitive: RH  Language:  recept: answers & follows,  express: 3-5word phrases w/o WFD or   paraphasias,  repeat: complete w/ nl pace; naming: body parts=3/3, objects=3/3    Praxis:  intrans: hand shapes to model=3/3  transitive: pen twirling  Lt wnl Rt wnl    Percept:  visual: traffic signs=3/4,  imbedded objs (w/o clutter)=1/5     Atten: visual:  Lt/Rt w/o DSSE wnl,  tactile: Lt/Rt hand contact w/o DSSE wnl    Mem: room topology=3/3;   remote: USholidays=3/3   Neuropsych (Irene, 11-Apr-2022): …amnestic mem impair, w/ verbal fluency deficits, impair  exec func, w/ preserved visspat ability...”This neuropsych profile, w/ neurological eval  eliciting h/o REM sleep dis and complex visual hallucins/illusions, c/w LBD.     Labs (to 24-Jun-2022):  wbc=5.7,    hct=50.9,    plm=054   ESR=4,  CRP=0.7   pt=11.1, INR=1.0  glu=95, bun/crt=27/0.9,  eGFR=82,     AST=25  ALT=23  AlkP=82   wwdl=106, hdl=46, lkn=139, chol/hdl=, vldl=, FU=373    TSH=1.37     R54=014  CSF LP (24-Jun-2022): C&C, wbc=1, rbc=6, (5l%lym, 44%mono=35), glu=59, pro=35 IgG=wnl  EKG (9-Sep-2022):  NSR=68 QTcF =398    Brain MRI (21-Feb-2022):  Vents, sulci & cisterns appro to vol loss.  No diffusion restrict of acute  infarct. Subtle vol loss w/ blooming on GE of Rt caudate c/w lacune w/ hemosiderin.   Also GE bloom in Lt centrum and Lt peduncle also w/ chronic hemosiderin deposition. No acute hemorr. Genl vol loss, mild for age. Nonspec T2 in WM c/w chronic sml vsl  dis. No mass effect or shift.  A&P: Summ: Several y/o incr memory trouble, w/ insomnia then psych hospit w/ dx PTSD ([place] vet)àprogressive cognitive decline.  Genl exam w/ Rt neck soft tissue wasting w/ prominent muscles and vessels. Neuro exam w/ Rt ptosis, face [Lt>Rt] & neck [Rt wasting].  Cognitive exam w/ poor Lt hand motor seq learning & poor vis discrim.  Labs: thrombocytopenia (szf=422) and hyperchol (omw=920), w/ benign CSF.  Neuropsych supports dx of LBD.  Brain MRI shows Rt caudate c/w lacune w/ hemosiderin along with Lt centrum and Lt peduncle hemosiderin.  Interpret: Pt has a h/o sudden onset of Rt Gutierrez's then followed by a sleep disorder leading to a psychiatric hospitalization and then followed by progressive cognitive decline c/w Lewy Body dis.  The MRI with Rt caudate and Lt centrum an peduncle lacunes suggest the alternative diagnoses that raise the possibility of micro-hemorrhagic cerebral amyloid angiopathy (CAA).  Plan: Medicinal regimen appropriate.  He is active and engaged.  I will not suggest medicinal or care changes at this time.  F/U:  They want to come off the patch and I will Rx [id number], then wait.  I will RTC in 2 months and then consider adding Namenda by titration.  I discussed these matters with the pt and companions.  They asked questions and showed good understanding.    Thank you for allowing me to participate in your care.   Sincerely yours, Saúl Sandoval M.D., Ph.D.

## 2024-04-08 ENCOUNTER — TELEPHONE (OUTPATIENT)
Dept: NEUROLOGY | Facility: CLINIC | Age: 79
End: 2024-04-08
Payer: MEDICARE

## 2024-04-11 ENCOUNTER — HOSPITAL ENCOUNTER (OUTPATIENT)
Dept: RADIOLOGY | Facility: HOSPITAL | Age: 79
Discharge: HOME | End: 2024-04-11
Payer: MEDICARE

## 2024-04-11 DIAGNOSIS — F02.80 LATE ONSET ALZHEIMER'S DISEASE WITHOUT BEHAVIORAL DISTURBANCE (MULTI): ICD-10-CM

## 2024-04-11 DIAGNOSIS — G30.1 LATE ONSET ALZHEIMER'S DISEASE WITHOUT BEHAVIORAL DISTURBANCE (MULTI): ICD-10-CM

## 2024-04-11 PROCEDURE — 70551 MRI BRAIN STEM W/O DYE: CPT

## 2024-04-11 PROCEDURE — 70551 MRI BRAIN STEM W/O DYE: CPT | Performed by: RADIOLOGY

## 2024-06-04 ENCOUNTER — TELEPHONE (OUTPATIENT)
Dept: NEUROLOGY | Facility: CLINIC | Age: 79
End: 2024-06-04
Payer: MEDICARE

## 2024-07-06 DIAGNOSIS — G30.1 LATE ONSET ALZHEIMER'S DISEASE WITHOUT BEHAVIORAL DISTURBANCE (MULTI): ICD-10-CM

## 2024-07-06 DIAGNOSIS — F02.80 LATE ONSET ALZHEIMER'S DISEASE WITHOUT BEHAVIORAL DISTURBANCE (MULTI): ICD-10-CM

## 2024-07-08 DIAGNOSIS — F02.80 LATE ONSET ALZHEIMER'S DISEASE WITHOUT BEHAVIORAL DISTURBANCE (MULTI): ICD-10-CM

## 2024-07-08 DIAGNOSIS — G30.1 LATE ONSET ALZHEIMER'S DISEASE WITHOUT BEHAVIORAL DISTURBANCE (MULTI): ICD-10-CM

## 2024-07-08 RX ORDER — DONEPEZIL HYDROCHLORIDE 10 MG/1
10 TABLET, FILM COATED ORAL NIGHTLY
Qty: 30 TABLET | Refills: 0 | Status: SHIPPED | OUTPATIENT
Start: 2024-07-08 | End: 2025-07-08

## 2024-07-08 RX ORDER — DONEPEZIL HYDROCHLORIDE 10 MG/1
10 TABLET, FILM COATED ORAL NIGHTLY
Qty: 30 TABLET | Refills: 0 | Status: SHIPPED | OUTPATIENT
Start: 2024-07-08

## 2024-08-13 ENCOUNTER — OFFICE VISIT (OUTPATIENT)
Dept: NEUROLOGY | Facility: CLINIC | Age: 79
End: 2024-08-13
Payer: MEDICARE

## 2024-08-13 VITALS
WEIGHT: 202.2 LBS | RESPIRATION RATE: 20 BRPM | BODY MASS INDEX: 25.96 KG/M2 | HEART RATE: 56 BPM | DIASTOLIC BLOOD PRESSURE: 78 MMHG | TEMPERATURE: 98.9 F | SYSTOLIC BLOOD PRESSURE: 159 MMHG

## 2024-08-13 DIAGNOSIS — G31.84 MCI (MILD COGNITIVE IMPAIRMENT): ICD-10-CM

## 2024-08-13 DIAGNOSIS — G30.9 ALZHEIMER DISEASE (MULTI): Primary | ICD-10-CM

## 2024-08-13 DIAGNOSIS — F02.80 ALZHEIMER DISEASE (MULTI): Primary | ICD-10-CM

## 2024-08-13 PROCEDURE — 99215 OFFICE O/P EST HI 40 MIN: CPT | Performed by: PSYCHIATRY & NEUROLOGY

## 2024-08-13 PROCEDURE — 1126F AMNT PAIN NOTED NONE PRSNT: CPT | Performed by: PSYCHIATRY & NEUROLOGY

## 2024-08-13 PROCEDURE — 1157F ADVNC CARE PLAN IN RCRD: CPT | Performed by: PSYCHIATRY & NEUROLOGY

## 2024-08-13 PROCEDURE — 1036F TOBACCO NON-USER: CPT | Performed by: PSYCHIATRY & NEUROLOGY

## 2024-08-13 PROCEDURE — 1159F MED LIST DOCD IN RCRD: CPT | Performed by: PSYCHIATRY & NEUROLOGY

## 2024-08-13 ASSESSMENT — ENCOUNTER SYMPTOMS
OCCASIONAL FEELINGS OF UNSTEADINESS: 0
LOSS OF SENSATION IN FEET: 0

## 2024-08-13 ASSESSMENT — PAIN SCALES - GENERAL: PAINLEVEL: 0-NO PAIN

## 2024-08-13 ASSESSMENT — PATIENT HEALTH QUESTIONNAIRE - PHQ9: 1. LITTLE INTEREST OR PLEASURE IN DOING THINGS: NOT AT ALL

## 2024-08-13 NOTE — PROGRESS NOTES
Home, [place]      Department of Neurology  [facility]    [facility] Consultation    PCP: Dr. Poppy Catherine         Re: Robert Alegria  : 1945  MRN [id number]    CC: 77yo man in care for memory loss.  Info from pt, spouse, cami (Omayra), and EMR.  HPI:  Remote: Pt's wife describes sudden onset of Rt facial immobility while pt eating a muffin. 2019: Family noticed memory trouble.  : Sustained insomnia, not w/ Covid.  Nightmares with thrashing, kicking, and daytime anxiety leading to psychiatric hospitalization led to dx PTSD (post [place]).  : Dx dementia w/ progressive decline. : Feels well, memory “on-off,” pauses “where am I going.”  Wife sts train of thought, less conversation with evident progression per family.  :  Benches & Free-weights, walking w/o problem.  :   Re-MRI again w/ evid of hemosiderin.  He tells me he is doing well, sleeps all night, OOB to BR, takes a nap in front TV.  No pain. He is eating well.  Goes to gym every day.  Wife sts some days not feeling well.  We spent our time on Rx changes.  Med Hx  Allergies: NKAD  Rx:   (stopped b/o bloody nose), [id number], [id number] qHS,    [id number], melatonin  [id number], testostgel1.62, D3, Mg, Calc, Zn, tearsOUprn   H/O: LBD, HLD, DVT, SNHL, BPH, hypogonad, Rt Gutierrez's Palsy, diplopia,     psychiatric dis (depress, anx, PTSD),    Lt leg DVT ()  S/P: trigger finger repair  Implants: none    Trauma: head injury in skating fall 40ya  FHx: Dad; Mom COPD, no sibs;  2dau (Heather Cutler) 1 son (Chan)  PSHx:  53y, in [employer], HS grad, [employer] vet, still driving; sleep, exercise, habits good   ROS: w/o Lt face tx effect nl Cardio-pulm nl     U/L-GI nl     Neuro: w/o LBP, neck pain, or HAs   Neurovasc: w/o symptomatic event (CVA) VBI   Exam: PG=057/78, HR=56, RR=20, rn=127  Genl: Neck FROM w/o pain  flex & extend,  Lungs w/ clear bilat,   RRR w/o MRG,  full carotids  w/o T/B   Abdo soft +BS    Extrems w/o edema  Neuro MS: not anxious or sad     no delus/hallucs/agitation    CN:  FEOM no diplopia now, I don't see OD ptosis today   face varies, less tone on Rt    Rt neck wasting but FROM and functionally intact   Motor: strong & steady   w/ wnl tone   w/o invol mvmts  (I don't see him as having DLBD)    MSRs: +2 (but for 0 AJs bilat)  w/o spread or release   Sens:  symm LT -Romberg,  little sway      Gait: nl pace and stride w/ symm armswing,  but tandem unstable to Lt or Rt (well adapted)   Cognitive: RH  Language:  recept: answers & follows,  express: 3-5word phrases w/o WFD/paraphas Praxis:  intrans: hand shapes wnl to model=3/3     Atten: focuses and filters complex conversation between the four of us   Neuropsych (Irene, 11-Apr-2022): …amnestic mem impair, w/ verbal fluency deficits, impair  exec func, w/ preserved visspat ability...”This neuropsych profile, w/ neurological eval  eliciting   h/o REM sleep dis and complex visual hallucins/illusions, c/w LBD.     Labs (to 1-):  wbc=5.6,    hct=51.1,    bdx=631   ESR=3,  CRP=0.9   pt=11.1, INR=1.0  glu=95, bun/crt=27/0.9,  eGFR=82,     AST=25  ALT=23  AlkP=82   tjly=859, hdl=46, pau=605, chol/hdl=, vldl=, LR=062    TSH=1.37     J76=558  CSF LP (24-Jun-2022): C&C, wbc=1, rbc=6, (5l%lym, 44%mono=35), glu=59, pro=35 IgG=wnl  EKG (9-Sep-2022):  NSR=68 QTcF =398    Brain MRI (21-Feb-2022):  Vents, sulci & cisterns appro to vol loss.  No diffusion restrict of acute  infarct. Subtle vol loss w/ blooming on GE of Rt caudate c/w lacune w/ hemosiderin.   Also GE bloom in Lt centrum and Lt peduncle also w/ chronic hemosiderin deposition. No acute hemorr. Genl vol loss, mild for age. Nonspec T2 in WM c/w chronic sml vsl dis. No mass effect or shift.  Brain MRI (4-2-2024): No restricted diffusion of acute infarct.  Blooming artifact Rt caudate head  (series 7, image 24) and small foci in Lt centrum semiovale (series series 7, image  23) and left cerebral peduncle (series 7, image 17) favored to represent hemosiderin deposition. These findings are overall unchanged from 02/21/2022. No acute intracranial hemorrhage. Nonspecific T2 hyperintense signal in the white matter, likely secondary to chronic small-vessel ischemic  disease. There is no mass effect or midline shift.  IMP:  1. No evidence of acute hemorrhage, mass or infarct.  2. Hemosiderin deposition as is consistent with small hemorrhages due to slow flow vascular malforms. Cerebral amyloid angiopathy (CAA) not excluded.  A&P: Summ: Several y/o incr memory trouble, w/ insomnia then psych hospit w/ dx PTSD ([place] vet)àprogressive cognitive decline.  Genl exam: doing well, goes to the gym qD, but gait is not fully stable carlo in turns in which he makes missteps that a well-compensated, but they are there. Cognitive exam w/ exceelnt language and organization.  I do not see new deficits.  Labs: vcru=197.  Neuropsych supports dx of LBD.  Brain MRI shows with several lacunes w/ hemosiderin along Lt centrum and Lt peduncle.  Interpret: Pt has a h/o sudden onset of Rt Gutierrez's then followed by a sleep disorder leading to a psychiatric hospitalization and then followed by progressive cognitive decline.  He is not Parkinsonian, his speech and gait are fluid, but the latter is subtly unstable.  The MRI with Rt caudate and Lt centrum and peduncle lacunes suggest the alternative diagnoses that raise the possibility of micro-hemorrhagic cerebral amyloid angiopathy (CAA).  Plan: Medicinal regimen now augmented by slow uptitation of meamtnappropriate.  He is active and engaged.  I will not suggest medicinal or care changes at this time.  F/U:  Rx [id number] and adding [id number] then incr to 10 if well-tolerated.  They are interested in amyloid mab tx and I placed order for ApoE genotyping.  I will RTC in 6 months with phone f/u and then consider adding Namenda by titration.  I discussed these matters  with the pt and companions.  They asked questions and showed good understanding.     Thank you for allowing me to participate in your care.   Sincerely yours, Saúl Sandoval M.D., Ph.D.

## 2024-08-14 RX ORDER — MEMANTINE HYDROCHLORIDE 5 MG/1
5 TABLET ORAL 2 TIMES DAILY
Qty: 720 TABLET | Refills: 0 | Status: SHIPPED | OUTPATIENT
Start: 2024-08-14 | End: 2025-08-09

## 2024-09-18 DIAGNOSIS — G30.1 LATE ONSET ALZHEIMER'S DISEASE WITHOUT BEHAVIORAL DISTURBANCE (MULTI): ICD-10-CM

## 2024-09-18 DIAGNOSIS — F02.80 LATE ONSET ALZHEIMER'S DISEASE WITHOUT BEHAVIORAL DISTURBANCE (MULTI): ICD-10-CM

## 2024-09-19 ENCOUNTER — LAB (OUTPATIENT)
Dept: LAB | Facility: LAB | Age: 79
End: 2024-09-19
Payer: MEDICARE

## 2024-09-19 DIAGNOSIS — G31.84 MCI (MILD COGNITIVE IMPAIRMENT): ICD-10-CM

## 2024-09-19 DIAGNOSIS — F02.80 ALZHEIMER DISEASE (MULTI): ICD-10-CM

## 2024-09-19 DIAGNOSIS — G30.9 ALZHEIMER DISEASE (MULTI): ICD-10-CM

## 2024-09-19 PROCEDURE — 81401 MOPATH PROCEDURE LEVEL 2: CPT

## 2024-09-24 RX ORDER — DONEPEZIL HYDROCHLORIDE 10 MG/1
10 TABLET, FILM COATED ORAL NIGHTLY
Qty: 90 TABLET | Refills: 3 | Status: SHIPPED | OUTPATIENT
Start: 2024-09-24 | End: 2025-09-24

## 2024-09-24 RX ORDER — DONEPEZIL HYDROCHLORIDE 10 MG/1
10 TABLET, FILM COATED ORAL NIGHTLY
Qty: 90 TABLET | Refills: 3 | OUTPATIENT
Start: 2024-09-24 | End: 2025-09-24

## 2024-10-03 LAB — SCAN RESULT: NORMAL

## 2025-01-04 ENCOUNTER — APPOINTMENT (OUTPATIENT)
Dept: RADIOLOGY | Facility: HOSPITAL | Age: 80
End: 2025-01-04
Payer: MEDICARE

## 2025-02-18 ENCOUNTER — OFFICE VISIT (OUTPATIENT)
Dept: NEUROLOGY | Facility: CLINIC | Age: 80
End: 2025-02-18
Payer: MEDICARE

## 2025-02-18 VITALS
DIASTOLIC BLOOD PRESSURE: 75 MMHG | WEIGHT: 207 LBS | BODY MASS INDEX: 26.58 KG/M2 | SYSTOLIC BLOOD PRESSURE: 147 MMHG | HEART RATE: 62 BPM

## 2025-02-18 DIAGNOSIS — G30.1 LATE ONSET ALZHEIMER'S DISEASE WITHOUT BEHAVIORAL DISTURBANCE (MULTI): Primary | ICD-10-CM

## 2025-02-18 DIAGNOSIS — F02.80 LATE ONSET ALZHEIMER'S DISEASE WITHOUT BEHAVIORAL DISTURBANCE (MULTI): Primary | ICD-10-CM

## 2025-02-18 PROCEDURE — 1157F ADVNC CARE PLAN IN RCRD: CPT | Performed by: PSYCHIATRY & NEUROLOGY

## 2025-02-18 PROCEDURE — 1159F MED LIST DOCD IN RCRD: CPT | Performed by: PSYCHIATRY & NEUROLOGY

## 2025-02-18 PROCEDURE — 99215 OFFICE O/P EST HI 40 MIN: CPT | Performed by: PSYCHIATRY & NEUROLOGY

## 2025-02-18 PROCEDURE — 1036F TOBACCO NON-USER: CPT | Performed by: PSYCHIATRY & NEUROLOGY

## 2025-02-18 PROCEDURE — 1126F AMNT PAIN NOTED NONE PRSNT: CPT | Performed by: PSYCHIATRY & NEUROLOGY

## 2025-02-18 RX ORDER — DONEPEZIL HYDROCHLORIDE 10 MG/1
10 TABLET, FILM COATED ORAL NIGHTLY
Qty: 30 TABLET | Refills: 2 | Status: SHIPPED | OUTPATIENT
Start: 2025-02-18 | End: 2025-02-18 | Stop reason: SINTOL

## 2025-02-18 RX ORDER — MEMANTINE HYDROCHLORIDE 10 MG/1
10 TABLET ORAL 2 TIMES DAILY
Qty: 60 TABLET | Refills: 11 | Status: SHIPPED | OUTPATIENT
Start: 2025-02-18 | End: 2026-02-18

## 2025-02-18 ASSESSMENT — PAIN SCALES - GENERAL: PAINLEVEL_OUTOF10: 0-NO PAIN

## 2025-02-18 NOTE — PROGRESS NOTES
[place], [place]      Department of Neurology  [facility]    FU Consultation    PCP: Dr. Poppy Catherine        2025  Re: Robert Alegria   : 1945  MRN [id number]    CC: 80yo man in care for memory loss.  Info from pt, spouse, cami (Omayra), and EMR.  HPI:  Remote: Pt's wife describes sudden onset of Rt facial immobility while pt eating a muffin. 2019: Family noticed memory trouble.  : Sustained insomnia, not w/ Covid.  Nightmares with thrashing, kicking, and daytime anxiety leading to psychiatric hospitalization led to dx PTSD (post [place]).  : Dx dementia w/ progressive decline. : Feels well, memory “on-off,” pauses “where am I going.”  Wife sts train of thought, less conversation with evident progression per family.  :  Benches & Free-weights, walking w/o problem.  :   Re-MRI again w/ evid of hemosiderin. He tells me he is doing well, sleeps all night, OOB to BR, naps w/ TV.  No pain. Eats well.  Daily gym.  Wife sts some days not feeling well.  We spent time on Rx changes.  : He is staying active, lifting, and shoveling.  He says memory is good.  Watches TV & showers, take cares of himself.  Med Hx  Allergies: NKAD  Rx:   (stopped b/o bloody nose), [id number], mgeeydi57 qHS, [id number],   ejpcjz186, melatonin  [id number], testostgel1.62, D3, Mg, Calc, Zn, E, tearsOUprn   H/O: LBD, HLD, DVT, SNHL, BPH, hypogonad, Rt Gutierrez's Palsy, diplopia,     psychiatric dis (depress, anx, PTSD),    Lt leg DVT ()  S/P: trigger finger repair  Implants: none    Trauma: head injury in skating fall 42ya  FHx: Dad; Mom COPD, no sibs;  2dau (Omayra, Heather) 1 son (Chan)  PSHx:  53y, in [employer] [employer], HS grad, [employer] vet, still driving; sleep, exercise, habits good   ROS: w/o Lt face tx effect nl Cardio-pulm nl     U/L-GI nl     Neuro: w/o LBP, neck pain, or HAs   Neurovasc: w/o symptomatic event (CVA) VBI   Exam: LG=681/75,  HR=62, RR=20, xg=240  Genl: Neck FROM w/o pain  flex & extend,  Lungs w/ clear bilat,   RRR w/o MRG,  full carotids w/o T/B   Abdo soft +BS    Extrems w/o edema  Neuro MS: not anxious or sad     no delus/hallucs/agitation    CN:  FEOM no diplopia, I don't see Gutierrez's OD today   face less tone on Rt from 1996    Rt neck wasting but FROM and functionally intact   Motor: strong & steady   w/ wnl tone   w/o invol mvmts  (I don't see him as having DLBD)    MSRs: +2 (but for 0 AJs bilat)  w/o spread or release   Sens:  symm LT -Romberg,  little sway      Gait: nl pace and stride w/ symm armswing, tandem unstable to Lt or Rt (well adapted)   Cognitive: RH  Language:  recept: answers & follows,  express: w/o WFD/paraphas   Praxis:  intrans: hand shapes wnl to model=3/3     Atten: focuses and filters complex conversation between the four of us   Neuropsych (Irene, 11-Apr-2022): …amnestic mem impair, w/ verbal fluency deficits, impair  exec func, w/ preserved visspat ability...”This neuropsych profile, w/ neurological eval  eliciting h/o REM sleep dis and complex visual hallucins/illusions, c/w LBD.     Labs (to 1-):  wbc=5.6,    hct=51.1,    nvg=420   ESR=3,  CRP=0.9   pt=11.1, INR=1.0  glu=95, bun/crt=27/0.9,  eGFR=82,     AST=25  ALT=23  AlkP=82    ApoE=3/3  oqlv=941, hdl=46, noc=252, chol/hdl=, vldl=, LC=204    TSH=1.37     B71=588  CSF LP (24-Jun-2022): C&C, wbc=1, rbc=6, (5l%lym, 44%mono=35), glu=59, pro=35 IgG=wnl  EKG (9-Sep-2022):  NSR=68 QTcF =398    Brain MRI (21-Feb-2022):  Vents, sulci & cisterns appro to vol loss.  No diffusion restrict of acute  infarct. Subtle vol loss w/ blooming on GE of Rt caudate c/w lacune w/ hemosiderin.   Also GE bloom in Lt centrum and Lt peduncle also w/ chronic hemosiderin deposition. No acute hemorr. Genl vol loss, mild for age. Nonspec T2 in WM c/w chronic sml vsl dis. No mass effect or shift.  Brain MRI (4-2-2024): No restricted diffusion of acute infarct.  Blooming  artifact Rt caudate head  (series 7, image 24) and small foci in Lt centrum semiovale (series series 7, image 23) and left cerebral peduncle (series 7, image 17) favored to represent hemosiderin deposition. These findings are overall unchanged from 02/21/2022. No acute intracranial hemorrhage. Nonspecific T2 hyperintense signal in the white matter, likely secondary to chronic small-vessel ischemic  disease. There is no mass effect or midline shift.  IMP:  1. No evidence of acute hemorrhage, mass or infarct.  2. Hemosiderin deposition as is consistent with small hemorrhages due to slow flow vascular malforms. Cerebral amyloid angiopathy (CAA) not excluded.  A&P: Summ: Several y/o incr memory trouble, w/ insomnia then psych hospit w/ dx PTSD ([place] vet)àprogressive cognitive decline.  Genl exam: doing well, goes to the gym qD, but gait is not fully stable carlo in turns in which he makes missteps that a well-compensated, but they are there. Cognitive exam w/ exceelnt language and organization.  I do not see new deficits.  Labs: yutd=502.  Neuropsych supports dx of LBD.  Brain MRI shows with several lacunes w/ hemosiderin along Lt centrum and Lt peduncle.  Interpret: Pt has a h/o sudden onset of Rt Gutierrez's then followed by a sleep disorder leading to a psychiatric hospitalization and then followed by progressive cognitive decline.  He is not Parkinsonian, his speech and gait are fluid, but the latter is subtly unstable.  The MRI with Rt caudate and Lt centrum and peduncle lacunes suggest the alternative diagnoses that raise the possibility of micro-hemorrhagic cerebral amyloid angiopathy (CAA).  Plan: We have struggled to balance the nocturnal hallics that are controlled by the Seroquel, vs cognitive augmentation by memantine which is well-tolerated and donepezil potential bradycardia.    ApoE=3/3.  I will RTC in 3 months to keep an eye on his rxn to the increased Seroquel and his stability.   They asked questions and  showed good understanding.     Thank you for allowing me to participate in your care.   Sincerely yours, Saúl Sandoval M.D., Ph.D.

## 2025-04-14 ENCOUNTER — HOSPITAL ENCOUNTER (OUTPATIENT)
Age: 80
Discharge: HOME | End: 2025-04-14
Payer: MEDICARE

## 2025-04-14 LAB
FLUAV RNA RESP QL NAA+PROBE: NOT DETECTED
FLUBV RNA RESP QL NAA+PROBE: NOT DETECTED
RSV BY PCR: NOT DETECTED
SARS-COV-2 RNA RESP QL NAA+PROBE: NOT DETECTED
SOURCE: NORMAL
SPECIMEN DESCRIPTION: NORMAL
SPECIMEN SOURCE: NORMAL

## 2025-04-14 PROCEDURE — 87636 SARSCOV2 & INF A&B AMP PRB: CPT

## 2025-04-14 PROCEDURE — 87634 RSV DNA/RNA AMP PROBE: CPT

## 2025-04-15 ENCOUNTER — HOSPITAL ENCOUNTER (OUTPATIENT)
Age: 80
Discharge: HOME | End: 2025-04-17
Payer: MEDICARE

## 2025-04-15 ENCOUNTER — HOSPITAL ENCOUNTER (OUTPATIENT)
Dept: GENERAL RADIOLOGY | Age: 80
Discharge: HOME | End: 2025-04-17
Payer: MEDICARE

## 2025-04-15 DIAGNOSIS — R05.9 COUGH, UNSPECIFIED TYPE: ICD-10-CM

## 2025-04-15 PROCEDURE — 71046 X-RAY EXAM CHEST 2 VIEWS: CPT

## 2025-06-07 ENCOUNTER — HOSPITAL ENCOUNTER (OUTPATIENT)
Dept: RADIOLOGY | Facility: HOSPITAL | Age: 80
Discharge: HOME | End: 2025-06-07
Payer: MEDICARE

## 2025-06-07 DIAGNOSIS — E78.00 PURE HYPERCHOLESTEROLEMIA, UNSPECIFIED: ICD-10-CM

## 2025-06-07 PROCEDURE — 75571 CT HRT W/O DYE W/CA TEST: CPT

## 2025-08-25 ENCOUNTER — HOSPITAL ENCOUNTER (OUTPATIENT)
Age: 80
Discharge: HOME | End: 2025-08-25
Payer: MEDICARE

## 2025-08-25 ENCOUNTER — HOSPITAL ENCOUNTER (OUTPATIENT)
Dept: GENERAL RADIOLOGY | Age: 80
Discharge: HOME | End: 2025-08-27
Payer: MEDICARE

## 2025-08-25 ENCOUNTER — HOSPITAL ENCOUNTER (OUTPATIENT)
Age: 80
Discharge: HOME | End: 2025-08-27
Payer: MEDICARE

## 2025-08-25 DIAGNOSIS — I25.10 DISEASE OF CARDIOVASCULAR SYSTEM: ICD-10-CM

## 2025-08-25 LAB
ANION GAP SERPL CALCULATED.3IONS-SCNC: 10 MMOL/L (ref 7–16)
BUN SERPL-MCNC: 27 MG/DL (ref 8–23)
CALCIUM SERPL-MCNC: 9.1 MG/DL (ref 8.8–10.2)
CHLORIDE SERPL-SCNC: 104 MMOL/L (ref 98–107)
CO2 SERPL-SCNC: 26 MMOL/L (ref 22–29)
CREAT SERPL-MCNC: 0.9 MG/DL (ref 0.7–1.2)
ERYTHROCYTE [DISTWIDTH] IN BLOOD BY AUTOMATED COUNT: 12.8 % (ref 11.5–15)
GFR, ESTIMATED: 86 ML/MIN/1.73M2
GLUCOSE SERPL-MCNC: 70 MG/DL (ref 74–99)
HCT VFR BLD AUTO: 44.1 % (ref 37–54)
HGB BLD-MCNC: 14.8 G/DL (ref 12.5–16.5)
INR PPP: 1.1
MCH RBC QN AUTO: 30.8 PG (ref 26–35)
MCHC RBC AUTO-ENTMCNC: 33.6 G/DL (ref 32–34.5)
MCV RBC AUTO: 91.9 FL (ref 80–99.9)
PLATELET, FLUORESCENCE: 155 K/UL (ref 130–450)
PMV BLD AUTO: 10.8 FL (ref 7–12)
POTASSIUM SERPL-SCNC: 4.5 MMOL/L (ref 3.5–5.1)
PROTHROMBIN TIME: 11.5 SEC (ref 9.3–12.4)
RBC # BLD AUTO: 4.8 M/UL (ref 3.8–5.8)
SODIUM SERPL-SCNC: 139 MMOL/L (ref 136–145)
WBC OTHER # BLD: 6.3 K/UL (ref 4.5–11.5)

## 2025-08-25 PROCEDURE — 85027 COMPLETE CBC AUTOMATED: CPT

## 2025-08-25 PROCEDURE — 85610 PROTHROMBIN TIME: CPT

## 2025-08-25 PROCEDURE — 80048 BASIC METABOLIC PNL TOTAL CA: CPT

## 2025-08-25 PROCEDURE — 36415 COLL VENOUS BLD VENIPUNCTURE: CPT

## 2025-08-25 PROCEDURE — 71046 X-RAY EXAM CHEST 2 VIEWS: CPT

## 2025-08-26 RX ORDER — LORATADINE 10 MG
5 TABLET,DISINTEGRATING ORAL DAILY
COMMUNITY

## 2025-08-26 RX ORDER — QUETIAPINE FUMARATE 100 MG/1
200 TABLET, FILM COATED ORAL 2 TIMES DAILY
COMMUNITY

## 2025-08-26 RX ORDER — ROSUVASTATIN CALCIUM 5 MG/1
5 TABLET, COATED ORAL DAILY
COMMUNITY

## 2025-08-26 RX ORDER — MEMANTINE HYDROCHLORIDE 10 MG/1
10 TABLET ORAL 2 TIMES DAILY
COMMUNITY

## 2025-08-27 ENCOUNTER — HOSPITAL ENCOUNTER (OUTPATIENT)
Age: 80
Setting detail: OUTPATIENT SURGERY
Discharge: HOME | End: 2025-08-27
Attending: SPECIALIST | Admitting: SPECIALIST
Payer: MEDICARE

## 2025-08-27 VITALS
OXYGEN SATURATION: 99 % | DIASTOLIC BLOOD PRESSURE: 68 MMHG | WEIGHT: 209 LBS | HEIGHT: 74 IN | RESPIRATION RATE: 15 BRPM | TEMPERATURE: 97.4 F | HEART RATE: 60 BPM | BODY MASS INDEX: 26.82 KG/M2 | SYSTOLIC BLOOD PRESSURE: 119 MMHG

## 2025-08-27 DIAGNOSIS — I25.10 CAD (CORONARY ARTERY DISEASE): ICD-10-CM

## 2025-08-27 LAB
ECHO BSA: 2.22 M2
EKG ATRIAL RATE: 58 BPM
EKG P AXIS: 204 DEGREES
EKG P-R INTERVAL: 186 MS
EKG Q-T INTERVAL: 424 MS
EKG QRS DURATION: 114 MS
EKG QTC CALCULATION (BAZETT): 416 MS
EKG R AXIS: -24 DEGREES
EKG T AXIS: 108 DEGREES
EKG VENTRICULAR RATE: 58 BPM

## 2025-08-27 PROCEDURE — C1894 INTRO/SHEATH, NON-LASER: HCPCS | Performed by: SPECIALIST

## 2025-08-27 PROCEDURE — 2709999900 HC NON-CHARGEABLE SUPPLY: Performed by: SPECIALIST

## 2025-08-27 PROCEDURE — 93458 L HRT ARTERY/VENTRICLE ANGIO: CPT | Performed by: SPECIALIST

## 2025-08-27 PROCEDURE — 7100000010 HC PHASE II RECOVERY - FIRST 15 MIN: Performed by: SPECIALIST

## 2025-08-27 PROCEDURE — 2580000003 HC RX 258: Performed by: SPECIALIST

## 2025-08-27 PROCEDURE — 6360000002 HC RX W HCPCS: Performed by: SPECIALIST

## 2025-08-27 PROCEDURE — 7100000001 HC PACU RECOVERY - ADDTL 15 MIN: Performed by: SPECIALIST

## 2025-08-27 PROCEDURE — 7100000000 HC PACU RECOVERY - FIRST 15 MIN: Performed by: SPECIALIST

## 2025-08-27 PROCEDURE — 99152 MOD SED SAME PHYS/QHP 5/>YRS: CPT | Performed by: SPECIALIST

## 2025-08-27 PROCEDURE — 6360000004 HC RX CONTRAST MEDICATION: Performed by: SPECIALIST

## 2025-08-27 PROCEDURE — C1769 GUIDE WIRE: HCPCS | Performed by: SPECIALIST

## 2025-08-27 PROCEDURE — 7100000011 HC PHASE II RECOVERY - ADDTL 15 MIN: Performed by: SPECIALIST

## 2025-08-27 PROCEDURE — 6370000000 HC RX 637 (ALT 250 FOR IP): Performed by: SPECIALIST

## 2025-08-27 RX ORDER — FENTANYL CITRATE 50 UG/ML
INJECTION, SOLUTION INTRAMUSCULAR; INTRAVENOUS PRN
Status: DISCONTINUED | OUTPATIENT
Start: 2025-08-27 | End: 2025-08-27 | Stop reason: HOSPADM

## 2025-08-27 RX ORDER — MIDAZOLAM HYDROCHLORIDE 1 MG/ML
INJECTION, SOLUTION INTRAMUSCULAR; INTRAVENOUS PRN
Status: DISCONTINUED | OUTPATIENT
Start: 2025-08-27 | End: 2025-08-27 | Stop reason: HOSPADM

## 2025-08-27 RX ORDER — ACETAMINOPHEN 325 MG/1
650 TABLET ORAL EVERY 4 HOURS PRN
Status: DISCONTINUED | OUTPATIENT
Start: 2025-08-27 | End: 2025-08-28 | Stop reason: HOSPADM

## 2025-08-27 RX ORDER — SODIUM CHLORIDE 0.9 % (FLUSH) 0.9 %
5-40 SYRINGE (ML) INJECTION PRN
Status: DISCONTINUED | OUTPATIENT
Start: 2025-08-27 | End: 2025-08-28 | Stop reason: HOSPADM

## 2025-08-27 RX ORDER — HYDRALAZINE HYDROCHLORIDE 20 MG/ML
INJECTION INTRAMUSCULAR; INTRAVENOUS PRN
Status: DISCONTINUED | OUTPATIENT
Start: 2025-08-27 | End: 2025-08-27 | Stop reason: HOSPADM

## 2025-08-27 RX ORDER — SODIUM CHLORIDE 0.9 % (FLUSH) 0.9 %
5-40 SYRINGE (ML) INJECTION EVERY 12 HOURS SCHEDULED
Status: DISCONTINUED | OUTPATIENT
Start: 2025-08-27 | End: 2025-08-28 | Stop reason: HOSPADM

## 2025-08-27 RX ORDER — SODIUM CHLORIDE 9 MG/ML
INJECTION, SOLUTION INTRAVENOUS CONTINUOUS
Status: DISCONTINUED | OUTPATIENT
Start: 2025-08-27 | End: 2025-08-28 | Stop reason: HOSPADM

## 2025-08-27 RX ORDER — SENNOSIDES 8.6 MG
325 CAPSULE ORAL ONCE
Status: COMPLETED | OUTPATIENT
Start: 2025-08-27 | End: 2025-08-27

## 2025-08-27 RX ORDER — IOPAMIDOL 755 MG/ML
INJECTION, SOLUTION INTRAVASCULAR PRN
Status: DISCONTINUED | OUTPATIENT
Start: 2025-08-27 | End: 2025-08-27 | Stop reason: HOSPADM

## 2025-08-27 RX ORDER — SODIUM CHLORIDE 9 MG/ML
INJECTION, SOLUTION INTRAVENOUS PRN
Status: DISCONTINUED | OUTPATIENT
Start: 2025-08-27 | End: 2025-08-28 | Stop reason: HOSPADM

## 2025-08-27 RX ADMIN — SODIUM CHLORIDE: 9 INJECTION, SOLUTION INTRAVENOUS at 08:49

## 2025-08-27 RX ADMIN — ASPIRIN 325 MG: 325 TABLET, COATED ORAL at 08:52

## 2025-08-27 ASSESSMENT — PAIN - FUNCTIONAL ASSESSMENT: PAIN_FUNCTIONAL_ASSESSMENT: 0-10

## 2025-08-27 ASSESSMENT — PAIN SCALES - GENERAL
PAINLEVEL_OUTOF10: 0
PAINLEVEL_OUTOF10: 0

## 2025-09-02 ENCOUNTER — OFFICE VISIT (OUTPATIENT)
Dept: NEUROLOGY | Facility: HOSPITAL | Age: 80
End: 2025-09-02
Payer: MEDICARE

## 2025-09-02 VITALS
WEIGHT: 210.54 LBS | DIASTOLIC BLOOD PRESSURE: 81 MMHG | BODY MASS INDEX: 27.02 KG/M2 | SYSTOLIC BLOOD PRESSURE: 130 MMHG | HEIGHT: 74 IN | HEART RATE: 163 BPM

## 2025-09-02 DIAGNOSIS — F02.80 LATE ONSET ALZHEIMER'S DISEASE WITHOUT BEHAVIORAL DISTURBANCE (MULTI): ICD-10-CM

## 2025-09-02 DIAGNOSIS — G30.1 LATE ONSET ALZHEIMER'S DISEASE WITHOUT BEHAVIORAL DISTURBANCE (MULTI): ICD-10-CM

## 2025-09-02 DIAGNOSIS — F03.B18 MODERATE DEMENTIA WITH OTHER BEHAVIORAL DISTURBANCE, UNSPECIFIED DEMENTIA TYPE: Primary | ICD-10-CM

## 2025-09-02 PROCEDURE — 1036F TOBACCO NON-USER: CPT | Performed by: PSYCHIATRY & NEUROLOGY

## 2025-09-02 PROCEDURE — G2212 PROLONG OUTPT/OFFICE VIS: HCPCS | Performed by: PSYCHIATRY & NEUROLOGY

## 2025-09-02 PROCEDURE — 99212 OFFICE O/P EST SF 10 MIN: CPT

## 2025-09-02 PROCEDURE — 1159F MED LIST DOCD IN RCRD: CPT | Performed by: PSYCHIATRY & NEUROLOGY

## 2025-09-02 PROCEDURE — 99215 OFFICE O/P EST HI 40 MIN: CPT | Performed by: PSYCHIATRY & NEUROLOGY

## 2025-09-02 PROCEDURE — G2211 COMPLEX E/M VISIT ADD ON: HCPCS | Performed by: PSYCHIATRY & NEUROLOGY

## 2025-09-02 PROCEDURE — 1126F AMNT PAIN NOTED NONE PRSNT: CPT | Performed by: PSYCHIATRY & NEUROLOGY

## 2025-09-02 RX ORDER — ROSUVASTATIN CALCIUM 5 MG/1
1 TABLET, COATED ORAL
COMMUNITY
Start: 2025-08-01

## 2025-09-02 RX ORDER — LORATADINE 10 MG
10 TABLET,DISINTEGRATING ORAL DAILY
COMMUNITY

## 2025-09-02 RX ORDER — VENLAFAXINE 37.5 MG/1
37.5 TABLET ORAL 2 TIMES DAILY
COMMUNITY

## 2025-09-02 RX ORDER — QUETIAPINE FUMARATE 100 MG/1
TABLET, FILM COATED ORAL
Qty: 90 TABLET | Refills: 3 | Status: SHIPPED | OUTPATIENT
Start: 2025-09-02

## 2025-09-02 ASSESSMENT — ENCOUNTER SYMPTOMS
LOSS OF SENSATION IN FEET: 0
DEPRESSION: 0
OCCASIONAL FEELINGS OF UNSTEADINESS: 0

## 2025-09-02 ASSESSMENT — PAIN SCALES - GENERAL: PAINLEVEL_OUTOF10: 0-NO PAIN

## 2025-09-06 ENCOUNTER — HOSPITAL ENCOUNTER (EMERGENCY)
Age: 80
Discharge: HOME | End: 2025-09-06

## 2025-09-06 VITALS
RESPIRATION RATE: 18 BRPM | TEMPERATURE: 97.5 F | HEART RATE: 73 BPM | DIASTOLIC BLOOD PRESSURE: 66 MMHG | SYSTOLIC BLOOD PRESSURE: 116 MMHG | OXYGEN SATURATION: 95 %

## 2025-09-06 DIAGNOSIS — R04.0 EPISTAXIS: Primary | ICD-10-CM

## (undated) DEVICE — PAD DEFIB AD RADIOTRANSPARENT W LD OUT

## (undated) DEVICE — INTRODUCER SHTH 4FR L11CM GWIRE 0035IN RED HUB POLYPR

## (undated) DEVICE — Device

## (undated) DEVICE — CANNULA NSL CANN NSL L25FT TBNG AD O2 SUP SFT UC

## (undated) DEVICE — CATHETER ANGIO 4FR L100CM S STL NYL JL4 3 SEG BRAID SFT

## (undated) DEVICE — KIT ANGIO W/ AT P65 PREM HND CTRL FOR CNTRST DEL ANGIOTOUCH

## (undated) DEVICE — GUIDEWIRE VASC L150CM DIA0.035IN TIP L3MM PTFE J CRV FIX

## (undated) DEVICE — CATHETER ANGIO 4FR L100CM S STL NYL JL5 3 SEG BRAID SFT

## (undated) DEVICE — CATHETER DIAG AD 4FR L100CM STD NYL JUDKINS R 4 TRULUMEN

## (undated) DEVICE — NEEDLE ANGIO 18GA L7CM 0038IN 1 WALL SELD SHLD UNIQUE HUB

## (undated) DEVICE — KIT MFLD ISOLATN NACL CNTRST PRT TBNG SPIK W/ PRSS TRNSDUC

## (undated) DEVICE — CATHETER DIAG 4FR 110CM 155DEG 0.038IN 9MM MIC LOOP VASC